# Patient Record
Sex: FEMALE | URBAN - METROPOLITAN AREA
[De-identification: names, ages, dates, MRNs, and addresses within clinical notes are randomized per-mention and may not be internally consistent; named-entity substitution may affect disease eponyms.]

---

## 2023-10-10 ENCOUNTER — APPOINTMENT (RX ONLY)
Dept: URBAN - METROPOLITAN AREA CLINIC 166 | Facility: CLINIC | Age: 38
Setting detail: DERMATOLOGY
End: 2023-10-10

## 2023-10-10 DIAGNOSIS — B07.8 OTHER VIRAL WARTS: ICD-10-CM

## 2023-10-10 DIAGNOSIS — Z80.8 FAMILY HISTORY OF MALIGNANT NEOPLASM OF OTHER ORGANS OR SYSTEMS: ICD-10-CM

## 2023-10-10 DIAGNOSIS — D485 NEOPLASM OF UNCERTAIN BEHAVIOR OF SKIN: ICD-10-CM

## 2023-10-10 DIAGNOSIS — L82.1 OTHER SEBORRHEIC KERATOSIS: ICD-10-CM

## 2023-10-10 DIAGNOSIS — L81.4 OTHER MELANIN HYPERPIGMENTATION: ICD-10-CM

## 2023-10-10 DIAGNOSIS — Z71.89 OTHER SPECIFIED COUNSELING: ICD-10-CM

## 2023-10-10 DIAGNOSIS — D18.0 HEMANGIOMA: ICD-10-CM

## 2023-10-10 DIAGNOSIS — D22 MELANOCYTIC NEVI: ICD-10-CM

## 2023-10-10 DIAGNOSIS — L57.8 OTHER SKIN CHANGES DUE TO CHRONIC EXPOSURE TO NONIONIZING RADIATION: ICD-10-CM

## 2023-10-10 PROBLEM — D18.01 HEMANGIOMA OF SKIN AND SUBCUTANEOUS TISSUE: Status: ACTIVE | Noted: 2023-10-10

## 2023-10-10 PROBLEM — D48.5 NEOPLASM OF UNCERTAIN BEHAVIOR OF SKIN: Status: ACTIVE | Noted: 2023-10-10

## 2023-10-10 PROBLEM — D22.5 MELANOCYTIC NEVI OF TRUNK: Status: ACTIVE | Noted: 2023-10-10

## 2023-10-10 PROCEDURE — ? DIAGNOSIS COMMENT

## 2023-10-10 PROCEDURE — ? OBSERVATION

## 2023-10-10 PROCEDURE — ? PHOTO-DOCUMENTATION

## 2023-10-10 PROCEDURE — ? TREATMENT REGIMEN

## 2023-10-10 PROCEDURE — ? COUNSELING

## 2023-10-10 PROCEDURE — ? DEFER

## 2023-10-10 PROCEDURE — 99203 OFFICE O/P NEW LOW 30 MIN: CPT

## 2023-10-10 ASSESSMENT — LOCATION SIMPLE DESCRIPTION DERM
LOCATION SIMPLE: LEFT BREAST
LOCATION SIMPLE: RIGHT UPPER BACK
LOCATION SIMPLE: RIGHT RING FINGER
LOCATION SIMPLE: LEFT UPPER BACK
LOCATION SIMPLE: UPPER BACK

## 2023-10-10 ASSESSMENT — LOCATION ZONE DERM
LOCATION ZONE: TRUNK
LOCATION ZONE: TRUNK
LOCATION ZONE: FINGER

## 2023-10-10 ASSESSMENT — LOCATION DETAILED DESCRIPTION DERM
LOCATION DETAILED: RIGHT INFERIOR MEDIAL UPPER BACK
LOCATION DETAILED: LEFT MEDIAL BREAST 10-11:00 REGION
LOCATION DETAILED: RIGHT MEDIAL UPPER BACK
LOCATION DETAILED: INFERIOR THORACIC SPINE
LOCATION DETAILED: LEFT INFERIOR MEDIAL UPPER BACK
LOCATION DETAILED: LEFT MEDIAL BREAST 9-10:00 REGION
LOCATION DETAILED: RIGHT PROXIMAL DORSAL RING FINGER

## 2023-10-10 NOTE — PROCEDURE: DEFER
X Size Of Lesion In Cm (Optional): 0
Introduction Text (Please End With A Colon): :
Detail Level: Simple
Other Procedure: shave biopsy
Procedure To Be Performed At Next Visit: Cryotherapy

## 2023-10-10 NOTE — PROCEDURE: TREATMENT REGIMEN
Plan: Discussed treating with LN2 or shave biopsy, but patient opted to wait to treat at next visit.
Detail Level: Zone

## 2023-10-10 NOTE — PROCEDURE: OBSERVATION
Detail Level: Simple
Size Of Lesion In Cm (Optional): 0.5
X Size Of Lesion In Cm (Optional): 0
Morphology Per Location (Optional): Patient recently scratched mole and is currently bleeding. Will re evaluate at next visit in 3 months.

## 2023-12-08 ENCOUNTER — APPOINTMENT (RX ONLY)
Dept: URBAN - METROPOLITAN AREA CLINIC 170 | Facility: CLINIC | Age: 38
Setting detail: DERMATOLOGY
End: 2023-12-08

## 2023-12-08 DIAGNOSIS — B07.8 OTHER VIRAL WARTS: ICD-10-CM

## 2023-12-08 PROBLEM — D48.5 NEOPLASM OF UNCERTAIN BEHAVIOR OF SKIN: Status: ACTIVE | Noted: 2023-12-08

## 2023-12-08 PROCEDURE — 11102 TANGNTL BX SKIN SINGLE LES: CPT

## 2023-12-08 PROCEDURE — ? BIOPSY BY SHAVE METHOD

## 2023-12-08 PROCEDURE — ? DIAGNOSIS COMMENT

## 2023-12-08 PROCEDURE — ? COUNSELING

## 2023-12-08 ASSESSMENT — LOCATION SIMPLE DESCRIPTION DERM: LOCATION SIMPLE: RIGHT HAND

## 2023-12-08 ASSESSMENT — LOCATION ZONE DERM: LOCATION ZONE: HAND

## 2023-12-08 ASSESSMENT — LOCATION DETAILED DESCRIPTION DERM: LOCATION DETAILED: RIGHT DORSAL RING FINGER METACARPOPHALANGEAL JOINT

## 2025-05-30 ENCOUNTER — APPOINTMENT (OUTPATIENT)
Dept: URBAN - METROPOLITAN AREA CLINIC 166 | Facility: CLINIC | Age: 40
Setting detail: DERMATOLOGY
End: 2025-05-30

## 2025-05-30 DIAGNOSIS — B07.8 OTHER VIRAL WARTS: ICD-10-CM

## 2025-05-30 PROBLEM — D48.5 NEOPLASM OF UNCERTAIN BEHAVIOR OF SKIN: Status: ACTIVE | Noted: 2025-05-30

## 2025-05-30 PROCEDURE — 11102 TANGNTL BX SKIN SINGLE LES: CPT

## 2025-05-30 PROCEDURE — ? DIAGNOSIS COMMENT

## 2025-05-30 PROCEDURE — ? COUNSELING

## 2025-05-30 PROCEDURE — ? BIOPSY BY SHAVE METHOD

## 2025-05-30 ASSESSMENT — LOCATION SIMPLE DESCRIPTION DERM: LOCATION SIMPLE: RIGHT HAND

## 2025-05-30 ASSESSMENT — LOCATION ZONE DERM: LOCATION ZONE: HAND

## 2025-05-30 ASSESSMENT — LOCATION DETAILED DESCRIPTION DERM: LOCATION DETAILED: RIGHT DORSAL RING FINGER METACARPOPHALANGEAL JOINT

## 2025-05-30 NOTE — PROCEDURE: BIOPSY BY SHAVE METHOD
